# Patient Record
Sex: MALE | Race: WHITE
[De-identification: names, ages, dates, MRNs, and addresses within clinical notes are randomized per-mention and may not be internally consistent; named-entity substitution may affect disease eponyms.]

---

## 2017-04-04 ENCOUNTER — HOSPITAL ENCOUNTER (OUTPATIENT)
Dept: HOSPITAL 75 - CARD | Age: 62
End: 2017-04-04
Attending: NURSE PRACTITIONER
Payer: COMMERCIAL

## 2017-04-04 VITALS — DIASTOLIC BLOOD PRESSURE: 98 MMHG | SYSTOLIC BLOOD PRESSURE: 149 MMHG

## 2017-04-04 VITALS — BODY MASS INDEX: 34.36 KG/M2 | WEIGHT: 232 LBS | HEIGHT: 69 IN

## 2017-04-04 DIAGNOSIS — I25.10: Primary | ICD-10-CM

## 2017-04-04 DIAGNOSIS — I48.0: ICD-10-CM

## 2017-04-04 DIAGNOSIS — E78.4: ICD-10-CM

## 2017-04-04 DIAGNOSIS — I65.23: ICD-10-CM

## 2017-04-04 DIAGNOSIS — R06.09: ICD-10-CM

## 2017-04-04 DIAGNOSIS — I10: ICD-10-CM

## 2017-04-04 PROCEDURE — 78452 HT MUSCLE IMAGE SPECT MULT: CPT

## 2017-04-04 PROCEDURE — 93017 CV STRESS TEST TRACING ONLY: CPT

## 2017-04-04 RX ADMIN — Medication PRN ML: at 07:23

## 2017-04-04 RX ADMIN — Medication PRN ML: at 09:17

## 2017-04-06 NOTE — STRESS TEST
PROCEDURE PHYSICIAN:   JORDAN MARTEL

 

RESTING AND POST REGADENOSON TECHNETIUM 99M TETROFOSMIN SPECT CT

IMAGING 

 

DATE OF PROCEDURE:  

04/04/2017

 

ORDERING PHYSICIAN:  

SARAH Carter. 

 

PRIMARY PHYSICIAN:

Dr. Puri

 

OTHER PHYSICIAN: 

Dr. Martel. 

 

CLINICAL DIAGNOSIS:  

Coronary artery disease, atrial fibrillation. 

 

Baseline images were carried out after injection of 10.74 mCi

technetium 99m tetrofosmin. This was followed by 0.4 mg of

regadenoson and 31.4 mCi technetium 99m tetrofosmin for stress

imaging. The electrocardiogram showed sinus rhythm at baseline

and it did not change significantly with regadenoson infusion.

The patient tolerated the procedure well. Review of images at

rest and following stress, does not indicate any distinct

perfusion defects consistent with significant myocardial ischemia

or infarction. Some degree of diaphragmatic attenuation is seen

both at rest and following regadenoson infusion. Gated images

show normal global left ventricular systolic function with normal

regional wall motion, including the diaphragmatic wall of the

left ventricle. Left ventricular ejection fraction is calculated

to be 63%. Left ventricular end-diastolic 130 mL. TID is absent

(0.89). 

 

CONCLUSION:

1.   This study does not indicate evidence of significant

myocardial ischemia or infarction. 

2.   Normal regional wall motion. 

3.   Normal global left ventricular systolic function with a

calculated ejection fraction of 63%. 

 

 

 

 

Job ID: [id number]

Dictated Date: 04/06/2017 09:14:00 

Transcription Date: 04/06/2017 10:43:58 / [email or web address]

## 2017-05-11 ENCOUNTER — HOSPITAL ENCOUNTER (OUTPATIENT)
Dept: HOSPITAL 75 - CARD | Age: 62
End: 2017-05-11
Attending: INTERNAL MEDICINE
Payer: COMMERCIAL

## 2017-05-11 DIAGNOSIS — R06.09: ICD-10-CM

## 2017-05-11 DIAGNOSIS — I65.23: ICD-10-CM

## 2017-05-11 DIAGNOSIS — I25.10: ICD-10-CM

## 2017-05-11 DIAGNOSIS — G47.33: ICD-10-CM

## 2017-05-11 DIAGNOSIS — I48.0: ICD-10-CM

## 2017-05-11 DIAGNOSIS — I10: Primary | ICD-10-CM

## 2017-05-11 DIAGNOSIS — E78.4: ICD-10-CM

## 2019-09-17 ENCOUNTER — HOSPITAL ENCOUNTER (OUTPATIENT)
Dept: HOSPITAL 75 - CARD | Age: 64
End: 2019-09-17
Attending: NURSE PRACTITIONER
Payer: COMMERCIAL

## 2019-09-17 VITALS — SYSTOLIC BLOOD PRESSURE: 152 MMHG | DIASTOLIC BLOOD PRESSURE: 96 MMHG

## 2019-09-17 VITALS — SYSTOLIC BLOOD PRESSURE: 148 MMHG | DIASTOLIC BLOOD PRESSURE: 89 MMHG

## 2019-09-17 VITALS — WEIGHT: 229.28 LBS | BODY MASS INDEX: 33.2 KG/M2 | HEIGHT: 69.69 IN

## 2019-09-17 DIAGNOSIS — I25.10: Primary | ICD-10-CM

## 2019-09-17 PROCEDURE — 93017 CV STRESS TEST TRACING ONLY: CPT

## 2019-09-17 PROCEDURE — 78452 HT MUSCLE IMAGE SPECT MULT: CPT

## 2019-09-18 NOTE — STRESS TEST
DATE OF SERVICE:  09/17/2019



RESTING AND POST REGADENOSON TECHNETIUM-99M TETROFOSMIN SPECT CT IMAGING



ORDERING PHYSICIAN:

SARAH Carter



PRIMARY PHYSICIAN:

Dr. Puri.



CLINICAL DIAGNOSIS:

Coronary artery disease.



Baseline images were carried out after injection of 10.75 mCi of technetium-99m

Tetrofosmin.  This was followed by 0.4 mg of regadenoson and 30.7 mCi of

technetium-99m Tetrofosmin for stress imaging.  The electrocardiogram showed

sinus rhythm at baseline.  It did not change significantly with the regadenoson

infusion.  The patient noted nausea, headache and shortness of breath following

regadenoson, which resolved in a few minutes.  Review of images at rest and

following stress does not indicate significant perfusion defect consistent with

significant myocardial ischemia or infarction.  There is some diaphragmatic

attenuation of the inferior wall of the left ventricle, both at rest and

following regadenoson infusion.  Gated images show normal regional wall motion,

including the inferior wall of the left ventricle.  Left ventricular ejection

fraction is calculated to be 65%.  Left ventricular end diastolic volume is 118

mL.  TID is absent (1.08).



CONCLUSIONS:

1.  No evidence of significant myocardial ischemia or infarction of the study.

2.  Normal regional wall motion.

3.  Normal global left ventricular systolic function with an ejection fraction

of 65%.

4.  Mild cardiomegaly is suggested on this study.





Job ID: [id number]

DocumentID: [id number]

Dictated Date:  09/18/2019 10:01:26

Transcription Date: 09/18/2019 13:22:11

Dictated By: JORDAN SANTIAGO MD, MA, FACP, FACC,

## 2021-05-04 ENCOUNTER — HOSPITAL ENCOUNTER (OUTPATIENT)
Dept: HOSPITAL 75 - CARD | Age: 66
End: 2021-05-04
Attending: INTERNAL MEDICINE
Payer: COMMERCIAL

## 2021-05-04 VITALS — SYSTOLIC BLOOD PRESSURE: 165 MMHG | DIASTOLIC BLOOD PRESSURE: 96 MMHG

## 2021-05-04 VITALS — HEIGHT: 69.69 IN | WEIGHT: 235.89 LBS | BODY MASS INDEX: 34.15 KG/M2

## 2021-05-04 DIAGNOSIS — R00.2: Primary | ICD-10-CM

## 2021-05-04 PROCEDURE — 93225 XTRNL ECG REC<48 HRS REC: CPT

## 2021-05-04 PROCEDURE — 78452 HT MUSCLE IMAGE SPECT MULT: CPT

## 2021-05-04 PROCEDURE — 93226 XTRNL ECG REC<48 HR SCAN A/R: CPT

## 2021-05-04 PROCEDURE — 93017 CV STRESS TEST TRACING ONLY: CPT

## 2021-05-05 NOTE — STRESS TEST
DATE OF SERVICE:  05/04/2019



RESTING AND POST REGADENOSON TECHNETIUM-99M TETROFOSMIN SPECT CT IMAGING



ORDERING PHYSICIAN:

Dr. Martel.



PRIMARY PHYSICIAN:

Dr. Puri.



CLINICAL DIAGNOSIS:

Palpitations.



Baseline images were carried out after injection of 10.10 mCi of technetium-99m

Tetrofosmin.  This was followed by 0.4 mg of Regadenoson and 29.6 mCi of

technetium-99m Tetrofosmin for stress imaging.  The electrocardiogram showed

sinus rhythm at baseline.  It did not change significantly with the Regadenoson

infusion.  Review of images at rest and following stress is technically

difficult because of the patient motion during image acquisition.  However,

there does not appear to be any distinct evidence of significant myocardial

ischemia or infarction.  Gated images show normal global left ventricular

systolic function with normal regional wall motion.  Left ventricular ejection

fraction is calculated to be 53%.  TID is absent (1.1).



CONCLUSIONS:

1.  No evidence of any significant myocardial ischemia or infarction on this

study.

2.  Normal regional wall motion.

3.  Normal global left ventricular systolic function with a calculated ejection

fraction of 53%.





Job ID: [id number]

DocumentID: [id number]

Dictated Date:  05/05/2021 09:46:04

Transcription Date: 05/05/2021 13:25:30

Dictated By: JORDAN MARTEL MD, MA, FACP, FACC,

## 2022-05-09 ENCOUNTER — HOSPITAL ENCOUNTER (OUTPATIENT)
Dept: HOSPITAL 75 - CARD | Age: 67
End: 2022-05-09
Attending: INTERNAL MEDICINE
Payer: MEDICARE

## 2022-05-09 DIAGNOSIS — I48.0: Primary | ICD-10-CM

## 2022-05-09 PROCEDURE — 93226 XTRNL ECG REC<48 HR SCAN A/R: CPT

## 2022-05-09 PROCEDURE — 93225 XTRNL ECG REC<48 HRS REC: CPT
